# Patient Record
Sex: MALE
[De-identification: names, ages, dates, MRNs, and addresses within clinical notes are randomized per-mention and may not be internally consistent; named-entity substitution may affect disease eponyms.]

---

## 2022-09-07 PROBLEM — Z00.00 ENCOUNTER FOR PREVENTIVE HEALTH EXAMINATION: Status: ACTIVE | Noted: 2022-09-07

## 2022-09-08 ENCOUNTER — APPOINTMENT (OUTPATIENT)
Dept: GASTROENTEROLOGY | Facility: CLINIC | Age: 22
End: 2022-09-08

## 2022-09-08 ENCOUNTER — NON-APPOINTMENT (OUTPATIENT)
Age: 22
End: 2022-09-08

## 2022-09-08 VITALS
OXYGEN SATURATION: 98 % | HEART RATE: 85 BPM | BODY MASS INDEX: 36.45 KG/M2 | SYSTOLIC BLOOD PRESSURE: 110 MMHG | DIASTOLIC BLOOD PRESSURE: 70 MMHG | HEIGHT: 78 IN | WEIGHT: 315 LBS

## 2022-09-08 DIAGNOSIS — F12.188 CANNABIS ABUSE WITH OTHER CANNABIS-INDUCED DISORDER: ICD-10-CM

## 2022-09-08 PROCEDURE — 99204 OFFICE O/P NEW MOD 45 MIN: CPT

## 2022-09-08 NOTE — ASSESSMENT
[FreeTextEntry1] : Very likely cannabis hyperemesis syndrome, improving slowly with cannabis cessation.\par \par Will check celiac markers r/o underlying celiac disease.

## 2022-09-08 NOTE — HISTORY OF PRESENT ILLNESS
[FreeTextEntry1] : Mr. Sanchez is a pleasant 21M no significant PMHx who presents to establish care after a recent hospitalization.\par \par He was kept overnight at [facility] in early August for N/V, then kept for 3 days due to the same symptoms around 8/5/22, strongly thought to due cannabis hyperemesis syndrome.  He at the time was a very heavy cannabis user, smoking cannabis throughout the day.\par \par He was unable to keep any food or liquids down, had frequent N/V.  Discharge paperwork reviewed, had an unremarkable CTA abd, labs reviewed including CBC, CMP that did not show significant abnormalities.\par \par Since that time he has quit cannabis use, symptoms have slowly improved, although he has still felt nauseous, no vomiting.\par \par Normal brown BM daily, no blood, no black stool.\par \par No weight change.\par \par Does not no family history of any GI diseases.

## 2022-09-09 LAB — IGA SER QL IEP: 177 MG/DL

## 2022-09-13 LAB
ENDOMYSIUM IGA SER QL: NEGATIVE
ENDOMYSIUM IGA TITR SER: NORMAL
GLIADIN IGA SER QL: 8.1 UNITS
GLIADIN IGG SER QL: <5 UNITS
GLIADIN PEPTIDE IGA SER-ACNC: NEGATIVE
GLIADIN PEPTIDE IGG SER-ACNC: NEGATIVE
TTG IGA SER IA-ACNC: <1.2 U/ML
TTG IGA SER-ACNC: NEGATIVE
TTG IGG SER IA-ACNC: 1.3 U/ML
TTG IGG SER IA-ACNC: NEGATIVE